# Patient Record
Sex: FEMALE | Race: OTHER | NOT HISPANIC OR LATINO | URBAN - METROPOLITAN AREA
[De-identification: names, ages, dates, MRNs, and addresses within clinical notes are randomized per-mention and may not be internally consistent; named-entity substitution may affect disease eponyms.]

---

## 2019-10-15 ENCOUNTER — EMERGENCY (EMERGENCY)
Facility: HOSPITAL | Age: 64
LOS: 1 days | Discharge: HOME | End: 2019-10-15
Attending: EMERGENCY MEDICINE | Admitting: EMERGENCY MEDICINE
Payer: MEDICAID

## 2019-10-15 VITALS
OXYGEN SATURATION: 100 % | DIASTOLIC BLOOD PRESSURE: 71 MMHG | RESPIRATION RATE: 18 BRPM | TEMPERATURE: 98 F | WEIGHT: 123.02 LBS | HEIGHT: 61 IN | SYSTOLIC BLOOD PRESSURE: 123 MMHG | HEART RATE: 48 BPM

## 2019-10-15 VITALS
DIASTOLIC BLOOD PRESSURE: 76 MMHG | SYSTOLIC BLOOD PRESSURE: 119 MMHG | OXYGEN SATURATION: 98 % | RESPIRATION RATE: 18 BRPM | HEART RATE: 50 BPM

## 2019-10-15 PROCEDURE — 71045 X-RAY EXAM CHEST 1 VIEW: CPT

## 2019-10-15 PROCEDURE — 84484 ASSAY OF TROPONIN QUANT: CPT

## 2019-10-15 PROCEDURE — 82550 ASSAY OF CK (CPK): CPT

## 2019-10-15 PROCEDURE — 36415 COLL VENOUS BLD VENIPUNCTURE: CPT

## 2019-10-15 PROCEDURE — 85025 COMPLETE CBC W/AUTO DIFF WBC: CPT

## 2019-10-15 PROCEDURE — 99285 EMERGENCY DEPT VISIT HI MDM: CPT

## 2019-10-15 PROCEDURE — 82553 CREATINE MB FRACTION: CPT

## 2019-10-15 PROCEDURE — 99283 EMERGENCY DEPT VISIT LOW MDM: CPT

## 2019-10-15 PROCEDURE — 71045 X-RAY EXAM CHEST 1 VIEW: CPT | Mod: 26

## 2019-10-15 PROCEDURE — 85730 THROMBOPLASTIN TIME PARTIAL: CPT

## 2019-10-15 PROCEDURE — 85610 PROTHROMBIN TIME: CPT

## 2019-10-15 PROCEDURE — 84443 ASSAY THYROID STIM HORMONE: CPT

## 2019-10-15 PROCEDURE — 80053 COMPREHEN METABOLIC PANEL: CPT

## 2019-10-15 RX ORDER — NEBIVOLOL HYDROCHLORIDE 5 MG/1
0 TABLET ORAL
Qty: 0 | Refills: 0 | DISCHARGE

## 2019-10-15 RX ORDER — OMEPRAZOLE 10 MG/1
0 CAPSULE, DELAYED RELEASE ORAL
Qty: 0 | Refills: 0 | DISCHARGE

## 2019-10-15 RX ORDER — AMITRIPTYLINE HCL 25 MG
0 TABLET ORAL
Qty: 0 | Refills: 0 | DISCHARGE

## 2019-10-15 RX ORDER — MECLIZINE HCL 12.5 MG
0 TABLET ORAL
Qty: 0 | Refills: 0 | DISCHARGE

## 2019-10-15 RX ORDER — CYCLOBENZAPRINE HYDROCHLORIDE 10 MG/1
0 TABLET, FILM COATED ORAL
Qty: 0 | Refills: 0 | DISCHARGE

## 2019-10-15 RX ORDER — LEVOTHYROXINE SODIUM 125 MCG
0 TABLET ORAL
Qty: 0 | Refills: 0 | DISCHARGE

## 2019-10-15 RX ORDER — DOCUSATE SODIUM 100 MG
0 CAPSULE ORAL
Qty: 0 | Refills: 0 | DISCHARGE

## 2019-10-15 NOTE — ED ADULT NURSE NOTE - CHIEF COMPLAINT QUOTE
pt reports midsternal chest tightness that began one week ago. Went to doctor last week but unsure what was done for her then. Pt is Mauritian speaking, spoke to pt in Mauritian.

## 2019-10-15 NOTE — ED PROVIDER NOTE - CLINICAL SUMMARY MEDICAL DECISION MAKING FREE TEXT BOX
63 y/o f hx HTN, hypothyroidism presents c/o palpitations, intermittent chest tightness over the past week; Pt concerned about possible thyroid dysfunction causing her sx, endocrinologist in on vacation.  Pt asymptomatic in ED, no CP, EKG sinus bradycardia.  Labs wnl, trop neg, TSH normal.  Will d/c to f/u with her cardiologist and endocrinologist.

## 2019-10-15 NOTE — ED PROVIDER NOTE - NSFOLLOWUPINSTRUCTIONS_ED_ALL_ED_FT
Palpitaciones  Palpitations  Las palpitaciones son sensaciones de que rincon latido cardíaco no es normal. El latido cardíaco puede sentirse de las siguientes maneras:  Desigual.Más rápido de lo normal.[place] un aleteo.Que saltea un latido.Generalmente no es un problema grave. En algunos casos, podría necesitar estudios para descartar algún problema grave.  Siga estas indicaciones en rincon casa:  Esté atento a cualquier cambio en rincon afección. [place] estas medidas para ayudar a controlar fernando síntomas:  Qué debe comer y beber     Evite lo siguiente:  Café, té, gaseosas y bebidas energéticas.Chocolate.Alcohol.Pastillas para adelgazar.Estilo de nava     Image   Intente reducir el nivel de estrés. Estas cosas pueden ayudarlo a relajarse:  Yoga.Respiración profunda y meditación.Actividad física.Usar palabras e imágenes para crear pensamientos positivos (visualización guiada).Usar la mente para controlar cosas en el cuerpo (biorretroalimentación).No consuma drogas.Descanse y duerma lo suficiente. Mantenga un horario habitual para acostarse.Instrucciones generales     Image   [place] los medicamentos de venta vadim y los recetados solamente elie se lo haya indicado el médico.No consuma ningún producto que contenga nicotina o tabaco, elie cigarrillos y cigarrillos electrónicos. Si necesita ayuda para dejar de fumar, consulte al médico.Concurra a todas las visitas de seguimiento elie se lo haya indicado el médico. [place] es importante. Es probable que necesite más estudios si las palpitaciones no desaparecen o empeoran.Comuníquese con un médico si:  Los síntomas ford más de 24 horas.Los síntomas ocurren con más frecuencia.[place] ayuda inmediatamente si:  Siente dolor en el pecho.Le falta el aire.Tiene un dolor de karen muy intenso.Se sienta mareado.Pierde el conocimiento (se desmaya).Resumen  Las palpitaciones son sensaciones de que rincon latido cardíaco es irregular o más rápido de lo normal. Se siente elie un aleteo o que falta un latido.Evite los alimentos y bebidas que pueden provocar palpitaciones. Entre ellos se incluyen la cafeína, el chocolate y las bebidas alcohólicas.Intente reducir el nivel de estrés. No fume ni consuma drogas.Obtenga ayuda de inmediato si se desmaya o tiene dolor de pecho, falta de aire, dolor de karen intenso o mareos.Esta información no tiene elie fin reemplazar el consejo del médico. Asegúrese de hacerle al médico cualquier pregunta que tenga.

## 2019-10-15 NOTE — ED ADULT TRIAGE NOTE - CHIEF COMPLAINT QUOTE
pt reports midsternal chest tightness that began one week ago. Went to doctor last week but unsure what was done for her then. Pt is Nigerian speaking, spoke to pt in Nigerian.

## 2019-10-15 NOTE — ED PROVIDER NOTE - OBJECTIVE STATEMENT
[id number]     65 y/o F with PMHx of hypertension and hypothyroidism presents to the ED complaining of intermittent palpitations and chest tightness over the course of the past week. Pt endorses recent visit to cardiologist, at which she underwent an echocardiogram and had her blood pressure medications changed. Pt is concerned that her current thyroid medication is ineffective. She wants to check thyroid levels in ED as her endocrinologist is currently on vacation. Pt reports that yesterday was her last episode of chest symptoms; she reports  being asymptomatic in ED.

## 2019-10-15 NOTE — ED ADULT NURSE NOTE - OBJECTIVE STATEMENT
pt taking intermittent palpitations and chest tightness over the past week , is concerned that it may be related to her thyroid medication

## 2019-10-15 NOTE — ED PROVIDER NOTE - ATTENDING CONTRIBUTION TO CARE
65 yo with hypotension, hypothyroidism, intermittent palpitations and chest tightness for one week, recently switched from metoprolol to bystolic at her cardiologist and had an echo.    pt w no CP today and feels worried her thyroid level is off  Labs wnl, no dysrhthmia , no ischemic changes, neg trop after entire day without CP,   stable for f/u with her cardologist and endocrinologist.  emergent return instructions were discussed with patient

## 2019-10-15 NOTE — ED ADULT NURSE NOTE - NSIMPLEMENTINTERV_GEN_ALL_ED
Implemented All Fall Risk Interventions:  [place] to call system. Call bell, personal items and telephone within reach. Instruct patient to call for assistance. Room bathroom lighting operational. Non-slip footwear when patient is off stretcher. Physically safe environment: no spills, clutter or unnecessary equipment. Stretcher in lowest position, wheels locked, appropriate side rails in place. Provide visual cue, wrist band, yellow gown, etc. Monitor gait and stability. Monitor for mental status changes and reorient to person, place, and time. Review medications for side effects contributing to fall risk. Reinforce activity limits and safety measures with patient and family.

## 2019-10-15 NOTE — ED PROVIDER NOTE - PATIENT PORTAL LINK FT
You can access the FollowMyHealth Patient Portal offered by [facility] by registering at the following website: [email or web address]. By joining [employer]’s FollowMyHealth portal, you will also be able to view your health information using other applications (apps) compatible with our system.

## 2019-10-19 DIAGNOSIS — R00.2 PALPITATIONS: ICD-10-CM

## 2019-10-19 DIAGNOSIS — R07.89 OTHER CHEST PAIN: ICD-10-CM

## 2024-01-03 ENCOUNTER — RX ONLY (RX ONLY)
Age: 69
End: 2024-01-03

## 2024-01-03 ENCOUNTER — OFFICE (OUTPATIENT)
Dept: URBAN - METROPOLITAN AREA CLINIC 92 | Facility: CLINIC | Age: 69
Setting detail: OPHTHALMOLOGY
End: 2024-01-03
Payer: COMMERCIAL

## 2024-01-03 DIAGNOSIS — H25.11: ICD-10-CM

## 2024-01-03 DIAGNOSIS — T85.22XA: ICD-10-CM

## 2024-01-03 DIAGNOSIS — H11.002: ICD-10-CM

## 2024-01-03 PROBLEM — H16.223 DRY EYE SYNDROME K SICCA; BOTH EYES: Status: ACTIVE | Noted: 2024-01-03

## 2024-01-03 PROCEDURE — 92025 CPTRIZED CORNEAL TOPOGRAPHY: CPT | Performed by: SPECIALIST

## 2024-01-03 PROCEDURE — 92004 COMPRE OPH EXAM NEW PT 1/>: CPT | Performed by: SPECIALIST

## 2024-01-03 ASSESSMENT — REFRACTION_CURRENTRX
OD_AXIS: 84
OD_SPHERE: +2.00
OS_ADD: +0.00
OD_ADD: +2.50
OS_AXIS: 97
OS_CYLINDER: -2.00
OD_OVR_VA: 20/
OD_CYLINDER: -1.00
OS_OVR_VA: 20/
OS_SPHERE: +0.75

## 2024-01-03 ASSESSMENT — REFRACTION_AUTOREFRACTION
OD_CYLINDER: +0.75
OS_CYLINDER: +2.00
OD_AXIS: 172
OS_AXIS: 02
OS_SPHERE: -1.75
OD_SPHERE: +1.25

## 2024-01-03 ASSESSMENT — SUPERFICIAL PUNCTATE KERATITIS (SPK)
OD_SPK: 1+ 2+
OS_SPK: 1+ 2+

## 2024-01-03 ASSESSMENT — CONFRONTATIONAL VISUAL FIELD TEST (CVF)
OS_FINDINGS: FULL
OD_FINDINGS: FULL

## 2024-01-03 ASSESSMENT — SPHEQUIV_DERIVED
OD_SPHEQUIV: 1.625
OS_SPHEQUIV: -0.75

## 2024-01-03 ASSESSMENT — LID EXAM ASSESSMENTS
OD_MEIBOMITIS: 2+
OS_MEIBOMITIS: 2+

## 2024-01-03 ASSESSMENT — CORNEAL PTERYGIUM: OS_PTERYGIUM: 2+

## 2025-01-09 ENCOUNTER — OFFICE (OUTPATIENT)
Dept: URBAN - METROPOLITAN AREA CLINIC 92 | Facility: CLINIC | Age: 70
Setting detail: OPHTHALMOLOGY
End: 2025-01-09
Payer: COMMERCIAL

## 2025-01-09 DIAGNOSIS — H40.053: ICD-10-CM

## 2025-01-09 DIAGNOSIS — H11.002: ICD-10-CM

## 2025-01-09 DIAGNOSIS — H25.11: ICD-10-CM

## 2025-01-09 DIAGNOSIS — T85.22XA: ICD-10-CM

## 2025-01-09 DIAGNOSIS — H16.223: ICD-10-CM

## 2025-01-09 PROCEDURE — 92012 INTRM OPH EXAM EST PATIENT: CPT | Performed by: SPECIALIST

## 2025-01-09 PROCEDURE — 92020 GONIOSCOPY: CPT | Performed by: SPECIALIST

## 2025-01-09 PROCEDURE — 92083 EXTENDED VISUAL FIELD XM: CPT | Performed by: SPECIALIST

## 2025-01-09 ASSESSMENT — KERATOMETRY
OS_K2POWER_DIOPTERS: 48.75
OD_K2POWER_DIOPTERS: 48.25
OS_K1POWER_DIOPTERS: 47.00
OS_AXISANGLE_DEGREES: 009
OD_AXISANGLE_DEGREES: 66
OD_K1POWER_DIOPTERS: 47.75
METHOD_AUTO_MANUAL: AUTO

## 2025-01-09 ASSESSMENT — REFRACTION_CURRENTRX
OS_ADD: +0.00
OS_AXIS: 97
OD_OVR_VA: 20/
OD_CYLINDER: -1.00
OD_ADD: +2.50
OS_CYLINDER: -2.00
OS_OVR_VA: 20/
OS_SPHERE: +0.75
OD_SPHERE: +2.00
OD_AXIS: 84

## 2025-01-09 ASSESSMENT — CORNEAL PTERYGIUM: OS_PTERYGIUM: 2+

## 2025-01-09 ASSESSMENT — REFRACTION_AUTOREFRACTION
OS_CYLINDER: +2.25
OD_SPHERE: +1.25
OS_SPHERE: -1.75
OS_AXIS: 174
OD_AXIS: 171
OD_CYLINDER: +0.75

## 2025-01-09 ASSESSMENT — PACHYMETRY
OS_CT_CORRECTION: 1
OS_CT_UM: 535
OD_CT_CORRECTION: 0
OD_CT_UM: 548

## 2025-01-09 ASSESSMENT — SUPERFICIAL PUNCTATE KERATITIS (SPK)
OD_SPK: 1+ 2+
OS_SPK: 1+ 2+

## 2025-01-09 ASSESSMENT — CONFRONTATIONAL VISUAL FIELD TEST (CVF)
OD_FINDINGS: FULL
OS_FINDINGS: FULL

## 2025-01-09 ASSESSMENT — TONOMETRY
OS_IOP_MMHG: 19
OD_IOP_MMHG: 18

## 2025-01-09 ASSESSMENT — LID EXAM ASSESSMENTS
OD_MEIBOMITIS: 2+
OS_MEIBOMITIS: 2+

## 2025-01-09 ASSESSMENT — VISUAL ACUITY
OD_BCVA: 20/30
OS_BCVA: 20/60-